# Patient Record
Sex: MALE | Race: WHITE | Employment: OTHER | URBAN - METROPOLITAN AREA
[De-identification: names, ages, dates, MRNs, and addresses within clinical notes are randomized per-mention and may not be internally consistent; named-entity substitution may affect disease eponyms.]

---

## 2022-08-15 ENCOUNTER — OFFICE VISIT (OUTPATIENT)
Dept: ORTHOPEDIC SURGERY | Age: 72
End: 2022-08-15
Payer: MEDICARE

## 2022-08-15 VITALS
OXYGEN SATURATION: 97 % | WEIGHT: 191.8 LBS | TEMPERATURE: 97.9 F | HEART RATE: 81 BPM | HEIGHT: 68 IN | BODY MASS INDEX: 29.07 KG/M2

## 2022-08-15 DIAGNOSIS — M17.12 PRIMARY OSTEOARTHRITIS OF LEFT KNEE: Primary | ICD-10-CM

## 2022-08-15 PROCEDURE — 20610 DRAIN/INJ JOINT/BURSA W/O US: CPT | Performed by: PHYSICIAN ASSISTANT

## 2022-08-15 RX ORDER — TAMSULOSIN HYDROCHLORIDE 0.4 MG/1
0.4 CAPSULE ORAL 2 TIMES DAILY
COMMUNITY
Start: 2022-01-11 | End: 2022-08-15

## 2022-08-15 RX ORDER — FINASTERIDE 5 MG/1
TABLET, FILM COATED ORAL
COMMUNITY
Start: 2022-05-27

## 2022-08-15 RX ORDER — TAMSULOSIN HYDROCHLORIDE 0.4 MG/1
CAPSULE ORAL
COMMUNITY
Start: 2022-07-09

## 2022-08-15 RX ORDER — SIMVASTATIN 40 MG
TABLET ORAL
COMMUNITY
Start: 2022-08-13

## 2022-08-15 RX ORDER — LEVOTHYROXINE SODIUM 175 UG/1
TABLET ORAL
COMMUNITY
Start: 2022-06-24

## 2022-08-15 ASSESSMENT — ENCOUNTER SYMPTOMS: RESPIRATORY NEGATIVE: 1

## 2022-08-15 NOTE — PROGRESS NOTES
Justin Etienne (:  1950) is a 71 y.o. male,Established patient, here for evaluation of the following chief complaint(s):  Follow-up (Left knee Pain )         ASSESSMENT/PLAN:  1. Primary osteoarthritis of left knee    No follow-ups on file. Subjective   SUBJECTIVE/OBJECTIVE:  This 78-year-old male with complaint of left knee pain. I have injected his knee previously with cortisone as well as Visco lubricant. He receives no improvement from cortisone injections. We have been proceeding with Visco lubricant injections, [place], which offer him almost 6 months of pain relief. He presents today requesting intra-articular Visco lubricant injection in the left knee. Review of Systems   Constitutional: Negative. HENT: Negative. Respiratory: Negative. Skin: Negative. Neurological: Negative. Objective   IMAGING: Previous imaging performed on 2020, and available in the [facility] health record, showed lateral compartment degenerative arthritis of the right knee and medial compartment degenerative arthritis of the left knee. Physical Exam  Musculoskeletal:      Comments: Left knee-joint effusion present. Popliteal cyst present. No warmth, erythema, fluctuance or sign of infection. Full extension, flexion is 120 degrees. The knee joint is stable, there is no laxity with valgus or varus stress. Tenderness with palpation to medial femoral condyle. Patellofemoral crepitus with range of motion. Calf is soft and nontender. Time Out  [x] Patient Verified  [x] Site Verified  [x] Laterality Verified  [x] Procedure Verified    Before aspiration/injection risks/benefits of a Viscolubricant injection including infection, local skin irritation, skin atrophy, continued pain/discomfort, elevated blood sugar, burning, failure to relieve pain, possible late infection were discussed with patient.    Patient verbalized understanding and wanted to proceed with planned procedure. After prepping and draping the left knee in the usual sterile fashion,  3 cc of Durolane  were injected intra-articularly after aspirating for clear yellow joint fluid without any complications. Patient tolerated this well. Post-procedure discomfort can be alleviated with additional medications/ice/elevation/rest over the first 24 hours as recommended. The patient tolerated the procedure well. If fluid was aspirated that was abnormal it was sent for further studies  in sterile specimen container as ordered to the lab     Diagnosis Orders   1. Primary osteoarthritis of left knee            Patient with known arthritis of his knee. He has received significant improvement previously with Visco lubricant. Injected the left knee today with Durolane. We discussed duration of time between injections. He will contact the office as his symptoms dictate. On this date 8/15/2022 I have spent 35 minutes reviewing previous notes, test results and face to face with the patient discussing the diagnosis and importance of compliance with the treatment plan as well as documenting on the day of the visit. An electronic signature was used to authenticate this note.     --PENELOPE Ferrera

## 2023-01-16 ENCOUNTER — HOSPITAL ENCOUNTER (OUTPATIENT)
Dept: DATA CONVERSION | Age: 73
End: 2023-01-16

## 2023-02-21 ENCOUNTER — OFFICE VISIT (OUTPATIENT)
Dept: ORTHOPEDIC SURGERY | Age: 73
End: 2023-02-21
Payer: MEDICARE

## 2023-02-21 VITALS
OXYGEN SATURATION: 98 % | WEIGHT: 191 LBS | HEART RATE: 70 BPM | TEMPERATURE: 98.4 F | BODY MASS INDEX: 28.95 KG/M2 | HEIGHT: 68 IN

## 2023-02-21 DIAGNOSIS — M75.81 RIGHT ROTATOR CUFF TENDINITIS: ICD-10-CM

## 2023-02-21 DIAGNOSIS — M17.11 PRIMARY OSTEOARTHRITIS OF RIGHT KNEE: ICD-10-CM

## 2023-02-21 DIAGNOSIS — M17.12 PRIMARY OSTEOARTHRITIS OF LEFT KNEE: Primary | ICD-10-CM

## 2023-02-21 PROCEDURE — 1123F ACP DISCUSS/DSCN MKR DOCD: CPT | Performed by: PHYSICIAN ASSISTANT

## 2023-02-21 PROCEDURE — G8427 DOCREV CUR MEDS BY ELIG CLIN: HCPCS | Performed by: PHYSICIAN ASSISTANT

## 2023-02-21 PROCEDURE — G8484 FLU IMMUNIZE NO ADMIN: HCPCS | Performed by: PHYSICIAN ASSISTANT

## 2023-02-21 PROCEDURE — 3017F COLORECTAL CA SCREEN DOC REV: CPT | Performed by: PHYSICIAN ASSISTANT

## 2023-02-21 PROCEDURE — G8417 CALC BMI ABV UP PARAM F/U: HCPCS | Performed by: PHYSICIAN ASSISTANT

## 2023-02-21 PROCEDURE — 99214 OFFICE O/P EST MOD 30 MIN: CPT | Performed by: PHYSICIAN ASSISTANT

## 2023-02-21 PROCEDURE — 20610 DRAIN/INJ JOINT/BURSA W/O US: CPT | Performed by: PHYSICIAN ASSISTANT

## 2023-02-21 PROCEDURE — 4004F PT TOBACCO SCREEN RCVD TLK: CPT | Performed by: PHYSICIAN ASSISTANT

## 2023-02-21 ASSESSMENT — ENCOUNTER SYMPTOMS: RESPIRATORY NEGATIVE: 1

## 2023-02-21 NOTE — PROGRESS NOTES
Elizabeth Lundberg (:  1950) is a 71 y.o. male,Established patient, here for evaluation of the following chief complaint(s):  Follow-up (Pt is here for follow up for bilateral knee pain)         ASSESSMENT/PLAN:  1. Primary osteoarthritis of left knee  -     [place] ARTHROCENTESIS ASPIR&/INJ MAJOR JT/BURSA W/O US  2. Primary osteoarthritis of right knee  -     [place] ARTHROCENTESIS ASPIR&/INJ MAJOR JT/BURSA W/O US  3. Right rotator cuff tendinitis      No follow-ups on file. Subjective   SUBJECTIVE/OBJECTIVE:  This is a 76-year-old male following up for complaint of bilateral knee pain. Previous intra-articular Visco lubricant injections have proven beneficial.  He is also complaining of right shoulder. .  He has a torn proximal biceps muscle and he is a bowler. He would like some exercises for his right arm weakness. Review of Systems   Constitutional: Negative. HENT: Negative. Respiratory: Negative. Skin: Negative. Neurological: Negative. Objective   Physical Exam  Musculoskeletal:      Comments: Right knee-little joint effusion, small popliteal cyst.  No warmth, erythema, fluctuance or sign of infection. Full extension, flexion is symmetrical and 120 degrees. The knee joint is stable, there is no laxity with valgus or varus stress. Tenderness with palpation at the medial femoral condyle. Patellofemoral crepitus with range of motion. Calf is soft and nontender. Left knee-little joint effusion, no popliteal cyst.  No warmth, erythema, fluctuance or sign of infection. Full extension, flexion is symmetrical at 120 degrees. The knee joint is stable, there is no laxity with valgus or varus stress. Tenderness with palpation of the medial femoral condyle. Patellofemoral crepitus with range of motion. Calf is soft and nontender. Right shoulder-no acromioclavicular, clavicle, SC joint tenderness with palpation. Prominent acromioclavicular joint.   Internal rotation is 0 degrees, external rotations 120 degrees about 10 degrees less than the left. Supraspinatus test elicits mild pain but no specific weakness. Apprehension sign is negative. Positive Chacho muscle. Sensation is intact distally to light touch. Time Out  [x] Patient Verified  [x] Site Verified  [x] Laterality Verified  [x] Procedure Verified    Before aspiration/injection risks/benefits of a Viscolubricant injection including infection, local skin irritation, skin atrophy, continued pain/discomfort, elevated blood sugar, burning, failure to relieve pain, possible late infection were discussed with patient. Patient verbalized understanding and wanted to proceed with planned procedure. After prepping and draping the right knee in the usual sterile fashion,  6 cc Synvisc 1  were injected intra-articularly after aspirating 7 cc clear yellow joint fluid without any complications. Patient tolerated this well. Post-procedure discomfort can be alleviated with additional medications/ice/elevation/rest over the first 24 hours as recommended. The patient tolerated the procedure well. If fluid was aspirated that was abnormal it was sent for further studies  in sterile specimen container as ordered to the lab     Diagnosis Orders   1. Primary osteoarthritis of left knee  [place] ARTHROCENTESIS ASPIR&/INJ MAJOR JT/BURSA W/O US      2. Primary osteoarthritis of right knee  NE ARTHROCENTESIS ASPIR&/INJ MAJOR JT/BURSA W/O US      3. Right rotator cuff tendinitis            Time Out  [x] Patient Verified  [x] Site Verified  [x] Laterality Verified  [x] Procedure Verified    Before aspiration/injection risks/benefits of a Viscolubricant injection including infection, local skin irritation, skin atrophy, continued pain/discomfort, elevated blood sugar, burning, failure to relieve pain, possible late infection were discussed with patient.    Patient verbalized understanding and wanted to proceed with planned procedure. After prepping and draping the left knee in the usual sterile fashion,  6 cc Synvisc 1  were injected intra-articularly after aspirating 4 clear yellow joint fluid without any complications. Patient tolerated this well. Post-procedure discomfort can be alleviated with additional medications/ice/elevation/rest over the first 24 hours as recommended. The patient tolerated the procedure well. If fluid was aspirated that was abnormal it was sent for further studies  in sterile specimen container as ordered to the lab     Diagnosis Orders   1. Primary osteoarthritis of left knee  [place] ARTHROCENTESIS ASPIR&/INJ MAJOR JT/BURSA W/O US      2. Primary osteoarthritis of right knee  ID ARTHROCENTESIS ASPIR&/INJ MAJOR JT/BURSA W/O US      3. Right rotator cuff tendinitis            Explained the patient does have arthritis of his knees. Injected with Visco lubricant bilaterally. We discussed rotator cuff strengthening exercises. I gave him a handout. We discussed cortisone injection in his shoulder, should he not notice the improvement he desires we will proceed with injection. On this date 2/21/2023 I have spent 35 minutes reviewing previous notes, test results and face to face with the patient discussing the diagnosis and importance of compliance with the treatment plan as well as documenting on the day of the visit. An electronic signature was used to authenticate this note.     --PENELOPE Maher

## 2023-08-22 ENCOUNTER — OFFICE VISIT (OUTPATIENT)
Dept: ORTHOPEDIC SURGERY | Age: 73
End: 2023-08-22
Payer: MEDICARE

## 2023-08-22 VITALS
WEIGHT: 191 LBS | HEIGHT: 68 IN | HEART RATE: 64 BPM | OXYGEN SATURATION: 98 % | TEMPERATURE: 97 F | BODY MASS INDEX: 28.95 KG/M2

## 2023-08-22 DIAGNOSIS — M17.12 PRIMARY OSTEOARTHRITIS OF LEFT KNEE: Primary | ICD-10-CM

## 2023-08-22 DIAGNOSIS — M17.11 PRIMARY OSTEOARTHRITIS OF RIGHT KNEE: ICD-10-CM

## 2023-08-22 PROCEDURE — 20610 DRAIN/INJ JOINT/BURSA W/O US: CPT | Performed by: PHYSICIAN ASSISTANT

## 2023-08-22 PROCEDURE — 99024 POSTOP FOLLOW-UP VISIT: CPT | Performed by: PHYSICIAN ASSISTANT

## 2023-08-22 ASSESSMENT — ENCOUNTER SYMPTOMS: RESPIRATORY NEGATIVE: 1

## 2023-08-22 NOTE — PROGRESS NOTES
proceed with planned procedure. After prepping and draping the right knee in the usual sterile fashion,  3 cc Durolane  were injected intra-articularly after aspirating 4cc clear yellow joint fluid without any complications. Patient tolerated this well. Post-procedure discomfort can be alleviated with additional medications/ice/elevation/rest over the first 24 hours as recommended. The patient tolerated the procedure well. If fluid was aspirated that was abnormal it was sent for further studies  in sterile specimen container as ordered to the lab     Diagnosis Orders   1. Primary osteoarthritis of left knee        2. Primary osteoarthritis of right knee          Time Out  [x] Patient Verified  [x] Site Verified  [x] Laterality Verified  [x] Procedure Verified    Before aspiration/injection risks/benefits of a Viscolubricant injection including infection, local skin irritation, skin atrophy, continued pain/discomfort, elevated blood sugar, burning, failure to relieve pain, possible late infection were discussed with patient. Patient verbalized understanding and wanted to proceed with planned procedure. After prepping and draping the left knee in the usual sterile fashion,  3 cc Durolane  were injected intra-articularly after aspirating 8 clear yellow joint fluid without any complications. Patient tolerated this well. Post-procedure discomfort can be alleviated with additional medications/ice/elevation/rest over the first 24 hours as recommended. The patient tolerated the procedure well. If fluid was aspirated that was abnormal it was sent for further studies  in sterile specimen container as ordered to the lab     Diagnosis Orders   1. Primary osteoarthritis of left knee        2. Primary osteoarthritis of right knee        Explained to the patient that he does have degenerative arthritis of his knees. Injected both knees today with thoroughly after aspiration.   We discussed the

## 2024-02-27 ENCOUNTER — NURSE ONLY (OUTPATIENT)
Dept: ORTHOPEDIC SURGERY | Age: 74
End: 2024-02-27
Payer: MEDICARE

## 2024-02-27 VITALS
OXYGEN SATURATION: 98 % | HEIGHT: 68 IN | TEMPERATURE: 97.4 F | BODY MASS INDEX: 28.95 KG/M2 | WEIGHT: 191 LBS | HEART RATE: 63 BPM

## 2024-02-27 DIAGNOSIS — M17.11 PRIMARY OSTEOARTHRITIS OF RIGHT KNEE: ICD-10-CM

## 2024-02-27 DIAGNOSIS — M17.12 PRIMARY OSTEOARTHRITIS OF LEFT KNEE: Primary | ICD-10-CM

## 2024-02-27 PROCEDURE — 20610 DRAIN/INJ JOINT/BURSA W/O US: CPT | Performed by: PHYSICIAN ASSISTANT

## 2024-02-27 PROCEDURE — 1123F ACP DISCUSS/DSCN MKR DOCD: CPT | Performed by: PHYSICIAN ASSISTANT

## 2024-02-27 PROCEDURE — 99214 OFFICE O/P EST MOD 30 MIN: CPT | Performed by: PHYSICIAN ASSISTANT

## 2024-02-27 ASSESSMENT — ENCOUNTER SYMPTOMS: RESPIRATORY NEGATIVE: 1

## 2024-02-27 NOTE — PROGRESS NOTES
Maico Wagner (:  1950) is a 73 y.o. male,Established patient, here for evaluation of the following chief complaint(s):  Knee Pain (Bilateral knee gel injections)         ASSESSMENT/PLAN:  1. Primary osteoarthritis of left knee  -     [place] ARTHROCENTESIS ASPIR&/INJ MAJOR JT/BURSA W/O US  -     XR KNEE LEFT (MIN 4 VIEWS); Future  -     sodium hyaluronate (DUROLANE) injection PRSY 60 mg; 60 mg, Intra-artICUlar, ONCE, 1 dose, On Tue 24 at 1045  2. Primary osteoarthritis of right knee  -     [place] ARTHROCENTESIS ASPIR&/INJ MAJOR JT/BURSA W/O US  -     sodium hyaluronate (DUROLANE) injection PRSY 60 mg; 60 mg, Intra-artICUlar, ONCE, 1 dose, On Tue 24 at 1045      No follow-ups on file.         Subjective   SUBJECTIVE/OBJECTIVE:  This is a very pleasant 73-year-old male following up with complaint of bilateral knee pain.  States his left knee pain is worse than his right.  He was received previous Visco lubricating injections and he noticed improvement.  Most recent Visco lubricant injection was greater than 6 months prior.  He denies any new injury.  He denies instability or locking.        Review of Systems   Constitutional: Negative.    HENT: Negative.     Respiratory: Negative.     Skin: Negative.    Neurological: Negative.           Objective radiographs left knee, 4 views, no acute fracture or dislocation.  Severe bicompartmental degenerative joint space narrowing of the left knee as well as moderate bicompartmental degenerative joint space narrowing of the right knee.  Physical Exam  Musculoskeletal:      Comments: Right knee-small joint effusion, small popliteal cyst. No warmth, erythema, fluctuance or sign of infection.  Full extension, flexion to 125 degrees.  Knee joint is stable, there is no laxity with valgus or varus stress.  Tenderness with palpation to medial femoral condyle.  Patellofemoral crepitus with range of motion.  Calf is soft and nontender.    Left knee-joint effusion present.

## 2024-09-10 ENCOUNTER — NURSE ONLY (OUTPATIENT)
Dept: ORTHOPEDIC SURGERY | Age: 74
End: 2024-09-10
Payer: MEDICARE

## 2024-09-10 VITALS
HEART RATE: 62 BPM | BODY MASS INDEX: 27.89 KG/M2 | HEIGHT: 68 IN | TEMPERATURE: 97.5 F | OXYGEN SATURATION: 99 % | WEIGHT: 184 LBS

## 2024-09-10 DIAGNOSIS — M17.12 PRIMARY OSTEOARTHRITIS OF LEFT KNEE: Primary | ICD-10-CM

## 2024-09-10 DIAGNOSIS — M17.11 PRIMARY OSTEOARTHRITIS OF RIGHT KNEE: ICD-10-CM

## 2024-09-10 PROCEDURE — 99214 OFFICE O/P EST MOD 30 MIN: CPT | Performed by: PHYSICIAN ASSISTANT

## 2024-09-10 PROCEDURE — 1123F ACP DISCUSS/DSCN MKR DOCD: CPT | Performed by: PHYSICIAN ASSISTANT

## 2024-09-10 PROCEDURE — 20610 DRAIN/INJ JOINT/BURSA W/O US: CPT | Performed by: PHYSICIAN ASSISTANT

## 2024-09-10 ASSESSMENT — ENCOUNTER SYMPTOMS: RESPIRATORY NEGATIVE: 1

## 2025-03-12 ENCOUNTER — OFFICE VISIT (OUTPATIENT)
Dept: ORTHOPEDIC SURGERY | Age: 75
End: 2025-03-12

## 2025-03-12 VITALS
HEART RATE: 67 BPM | OXYGEN SATURATION: 97 % | HEIGHT: 68 IN | WEIGHT: 190 LBS | TEMPERATURE: 97 F | BODY MASS INDEX: 28.79 KG/M2

## 2025-03-12 DIAGNOSIS — M17.11 PRIMARY OSTEOARTHRITIS OF RIGHT KNEE: ICD-10-CM

## 2025-03-12 DIAGNOSIS — M17.12 PRIMARY OSTEOARTHRITIS OF LEFT KNEE: Primary | ICD-10-CM

## 2025-03-12 ASSESSMENT — ENCOUNTER SYMPTOMS: RESPIRATORY NEGATIVE: 1

## 2025-03-12 NOTE — PROGRESS NOTES
aMico Wagner (:  1950) is a 74 y.o. male,Established patient, here for evaluation of the following chief complaint(s):  Follow-up (Bilateral knee OA)         Assessment & Plan  Primary osteoarthritis of left knee   Chronic, not at goal (unstable), intra-articular Visco lubricating injection    Orders:    DRAIN/INJECT LARGE JOINT/BURSA    sodium hyaluronate (DUROLANE) injection PRSY 60 mg    Primary osteoarthritis of right knee   Chronic, at goal (stable), changes made today: Intra-articular Visco lubricating injection    Orders:    DRAIN/INJECT LARGE JOINT/BURSA    sodium hyaluronate (DUROLANE) injection PRSY 60 mg      Assessment & Plan          No follow-ups on file.       Subjective   History of Present Illness           Review of Systems   Constitutional: Negative.    HENT: Negative.     Respiratory: Negative.     Skin: Negative.    Neurological: Negative.           Results        Objective   Physical Exam  Constitutional:       Appearance: Normal appearance.   HENT:      Head: Normocephalic and atraumatic.      Mouth/Throat:      Mouth: Mucous membranes are moist.   Pulmonary:      Effort: Pulmonary effort is normal.   Musculoskeletal:      Cervical back: Normal range of motion.      Comments: Right knee-small joint effusion, small popliteal cyst. No warmth, erythema, fluctuance or sign of infection.  Full extension, flexion to 125 degrees.  Knee joint is stable, there is no laxity with valgus or varus stress.  Tenderness with palpation to medial femoral condyle.  Patellofemoral crepitus with range of motion.  Calf is soft and nontender.    Left knee-joint effusion present.  Popliteal cyst present.  No warmth, erythema, fluctuance or sign of infection.  Full extension, flexion is 120 degrees.  The knee joint is stable, there is no laxity with valgus or varus stress.  Tenderness with palpation at the medial femoral condyle.  Patellofemoral crepitus with range of motion.  Calf is soft and nontender.

## 2025-07-18 ENCOUNTER — OFFICE VISIT (OUTPATIENT)
Age: 75
End: 2025-07-18
Payer: MEDICARE

## 2025-07-18 VITALS
BODY MASS INDEX: 28.79 KG/M2 | HEIGHT: 68 IN | WEIGHT: 190 LBS | OXYGEN SATURATION: 96 % | TEMPERATURE: 97.6 F | HEART RATE: 70 BPM

## 2025-07-18 DIAGNOSIS — M17.11 PRIMARY OSTEOARTHRITIS OF RIGHT KNEE: ICD-10-CM

## 2025-07-18 DIAGNOSIS — M17.12 PRIMARY OSTEOARTHRITIS OF LEFT KNEE: Primary | ICD-10-CM

## 2025-07-18 PROCEDURE — 20610 DRAIN/INJ JOINT/BURSA W/O US: CPT | Performed by: PHYSICIAN ASSISTANT

## 2025-07-18 PROCEDURE — 99213 OFFICE O/P EST LOW 20 MIN: CPT | Performed by: PHYSICIAN ASSISTANT

## 2025-07-18 RX ORDER — TRIAMCINOLONE ACETONIDE 40 MG/ML
40 INJECTION, SUSPENSION INTRA-ARTICULAR; INTRAMUSCULAR ONCE
Status: COMPLETED | OUTPATIENT
Start: 2025-07-18 | End: 2025-07-18

## 2025-07-18 RX ORDER — LIDOCAINE HYDROCHLORIDE 10 MG/ML
4 INJECTION, SOLUTION INFILTRATION; PERINEURAL ONCE
Status: COMPLETED | OUTPATIENT
Start: 2025-07-18 | End: 2025-07-18

## 2025-07-18 RX ADMIN — TRIAMCINOLONE ACETONIDE 40 MG: 400 INJECTION, SUSPENSION INTRA-ARTICULAR; INTRAMUSCULAR at 15:03

## 2025-07-18 RX ADMIN — LIDOCAINE HYDROCHLORIDE 4 ML: 10 INJECTION, SOLUTION INFILTRATION; PERINEURAL at 15:03

## 2025-07-18 RX ADMIN — TRIAMCINOLONE ACETONIDE 40 MG: 400 INJECTION, SUSPENSION INTRA-ARTICULAR; INTRAMUSCULAR at 15:04

## 2025-07-18 RX ADMIN — LIDOCAINE HYDROCHLORIDE 4 ML: 10 INJECTION, SOLUTION INFILTRATION; PERINEURAL at 15:02

## 2025-07-18 ASSESSMENT — ENCOUNTER SYMPTOMS: RESPIRATORY NEGATIVE: 1

## 2025-07-18 NOTE — PROGRESS NOTES
Maico Wagner (:  1950) is a 75 y.o. male,Established patient, here for evaluation of the following chief complaint(s):  Follow-up (ORTHO INJECTION - bilateral knee inj/Pain: 2/10)         Assessment & Plan  Primary osteoarthritis of left knee   Chronic, worsening (exacerbation), aspirate and inject with cortisone    Orders:    DRAIN/INJECT LARGE JOINT/BURSA    lidocaine 1 % injection 4 mL    triamcinolone acetonide (KENALOG-40) injection 40 mg    Primary osteoarthritis of right knee   Chronic, worsening (exacerbation), aspirate and inject with cortisone    Orders:    DRAIN/INJECT LARGE JOINT/BURSA    lidocaine 1 % injection 4 mL    triamcinolone acetonide (KENALOG-40) injection 40 mg      Assessment & Plan  Knee pain- Suggested Aspercreme with lidocaine twice daily. Advised ice application to reduce inflammation, cautioned against heat. Recommended Aleve or ibuprofen for inflammation. Administered cortisone injection today.    PROCEDURE  Administered cortisone injection today.        No follow-ups on file.       Subjective   History of Present Illness  The patient is a 75-year-old male presenting with bilateral knee pain.   Foot Pain  - Onset: Years ago  - Location: Bilateral knees  - Duration: Several years.  - Character: Aching  - Alleviating/Aggravating Factors: Increases with activity level has received previous intra-articular Visco lubricant as well as cortisone injections.  - Timing: Received gel injection in 2025.    PAST SURGICAL HISTORY:      SOCIAL HISTORY  - Exercise: Mows the lawn         Review of Systems   Constitutional: Negative.    HENT: Negative.     Respiratory: Negative.     Skin: Negative.    Neurological: Negative.           Results        Objective   Physical Exam  Constitutional:       Appearance: Normal appearance.   HENT:      Head: Normocephalic and atraumatic.      Mouth/Throat:      Mouth: Mucous membranes are moist.   Pulmonary:      Effort: Pulmonary effort is normal.